# Patient Record
Sex: FEMALE | Race: WHITE | NOT HISPANIC OR LATINO
[De-identification: names, ages, dates, MRNs, and addresses within clinical notes are randomized per-mention and may not be internally consistent; named-entity substitution may affect disease eponyms.]

---

## 2018-03-12 PROBLEM — Z00.00 ENCOUNTER FOR PREVENTIVE HEALTH EXAMINATION: Status: ACTIVE | Noted: 2018-03-12

## 2022-11-29 ENCOUNTER — RESULT REVIEW (OUTPATIENT)
Age: 47
End: 2022-11-29

## 2023-09-18 ENCOUNTER — APPOINTMENT (OUTPATIENT)
Dept: ORTHOPEDIC SURGERY | Facility: CLINIC | Age: 48
End: 2023-09-18

## 2024-02-06 ENCOUNTER — RX ONLY (RX ONLY)
Age: 49
End: 2024-02-06

## 2024-02-06 ENCOUNTER — OFFICE (OUTPATIENT)
Facility: LOCATION | Age: 49
Setting detail: OPHTHALMOLOGY
End: 2024-02-06
Payer: COMMERCIAL

## 2024-02-06 DIAGNOSIS — H00.021: ICD-10-CM

## 2024-02-06 PROCEDURE — 92012 INTRM OPH EXAM EST PATIENT: CPT | Performed by: OPHTHALMOLOGY

## 2024-02-06 ASSESSMENT — CONFRONTATIONAL VISUAL FIELD TEST (CVF)
OS_FINDINGS: FULL
OD_FINDINGS: FULL

## 2024-03-20 ENCOUNTER — OFFICE (OUTPATIENT)
Facility: LOCATION | Age: 49
Setting detail: OPHTHALMOLOGY
End: 2024-03-20
Payer: COMMERCIAL

## 2024-03-20 DIAGNOSIS — H00.021: ICD-10-CM

## 2024-03-20 PROCEDURE — 99213 OFFICE O/P EST LOW 20 MIN: CPT | Performed by: OPTOMETRIST

## 2024-05-01 ENCOUNTER — APPOINTMENT (OUTPATIENT)
Dept: ORTHOPEDIC SURGERY | Facility: CLINIC | Age: 49
End: 2024-05-01
Payer: COMMERCIAL

## 2024-05-01 VITALS — BODY MASS INDEX: 28.17 KG/M2 | WEIGHT: 165 LBS | HEIGHT: 64 IN

## 2024-05-01 PROCEDURE — 99204 OFFICE O/P NEW MOD 45 MIN: CPT

## 2024-05-01 PROCEDURE — 20610 DRAIN/INJ JOINT/BURSA W/O US: CPT

## 2024-05-01 PROCEDURE — 73502 X-RAY EXAM HIP UNI 2-3 VIEWS: CPT

## 2024-05-01 NOTE — PHYSICAL EXAM
[Right] : right hip with pelvis [There are no fractures, subluxations or dislocations. No significant abnormalities are seen] : There are no fractures, subluxations or dislocations. No significant abnormalities are seen [de-identified] : Constitutional: The patient appears well developed, well nourished. Examination of patients ability to communicate functionally was normal.       Neurologic: Coordination is normal. Alert and oriented to time, place and person. No evidence of mood disorder, calm affect.         RIGHT    HIP/THIGH: Inspection of the hip/thigh is as follows: Inspection shows no swelling, no ecchymosis, no erythema, no rashes, no masses and no enlarged lymph nodes.       Palpation of the hip/thigh is as follows: greater trochanter tenderness. no groin tenderness.       Range of motion of the hip is as follows in degrees:        Flexion: 120    Abduction:  40    External rotation:  40    Internal rotation:  30        Strength testing of the hip/thigh is as follows:       Hip flexion strength:  5/5,    Hip extension strength:  5/5    Hip abduction strength:   5/5     Hip adduction strength:   5/5.       Special tests of the hip/thigh is as follows: pain in bursa with internal rotation and pain bursa with external rotation.       Neurological testing of the hip/thigh is as follows: motor exam 5/5 throughout, light touch intact throughout and no focal motor defecits.       Gait and function is as follows: non-antalgic gait.

## 2024-05-01 NOTE — DISCUSSION/SUMMARY
[de-identified] : Extensive discussion of the options was had with the patient. This discussion included both surgical and nonsurgical options. Options including but not limited to cortisone injection, viscosupplementation, physical therapy, oral anti-inflammatories and MRI were discussed with the patient. We also discussed the option of observation, allowing the patient to continue rest, ice, prescription drug NSAIDs and following up if the condition does not improve. Time was taken to go over any questions the patient had in regard to any of the treatment plans described. Patient just started PT For the hip this week. She will continue PT and today had CSI Right greater troch. She will f/u in 4-6 weeks.

## 2024-05-01 NOTE — HISTORY OF PRESENT ILLNESS
[de-identified] : Patient c/o Right hip pain for the past yr. SHe denies any injury however last yr she was doing PT for her knee and the hip became painful. Pain is lateral hip and over eduar thigh worse with activity and the worst with leaning on it/sleeping. SHe denies any groin pain. She has tried Ice and heat without much relief.

## 2024-06-03 ENCOUNTER — APPOINTMENT (OUTPATIENT)
Dept: ORTHOPEDIC SURGERY | Facility: CLINIC | Age: 49
End: 2024-06-03
Payer: COMMERCIAL

## 2024-06-03 ENCOUNTER — RESULT REVIEW (OUTPATIENT)
Age: 49
End: 2024-06-03

## 2024-06-03 VITALS — BODY MASS INDEX: 28 KG/M2 | HEIGHT: 64 IN | WEIGHT: 164 LBS

## 2024-06-03 DIAGNOSIS — Z78.9 OTHER SPECIFIED HEALTH STATUS: ICD-10-CM

## 2024-06-03 DIAGNOSIS — M70.61 TROCHANTERIC BURSITIS, RIGHT HIP: ICD-10-CM

## 2024-06-03 PROCEDURE — 99214 OFFICE O/P EST MOD 30 MIN: CPT

## 2024-06-03 RX ORDER — METHYLPREDNISOLONE 4 MG/1
4 TABLET ORAL
Qty: 1 | Refills: 0 | Status: ACTIVE | COMMUNITY
Start: 2024-06-03 | End: 1900-01-01

## 2024-06-03 NOTE — PHYSICAL EXAM

## 2024-06-03 NOTE — DISCUSSION/SUMMARY
[de-identified] : Extensive discussion of the options was had with the patient. This discussion included both surgical and nonsurgical options. Options including but not limited to cortisone injection, visco-supplementation, physical therapy, oral anti-inflammatories, MRI, were discussed with the patient. We also discussed the option of observation, allowing the patient to continue rest, ice, prescription drug NSAIDs and following up if the condition does not improve. Time was taken to go over any questions the patient had in regard to any of the treatment plans described. Patient was given Rx for MRI of the right hip to further evaluate for any ligamentous, muscle and cartilaginous injury that is suspected due to physical examination and patients description of pain, clicking, and feelings of instability and/or weakness. Patient was instructed to f/u after imaging for review. Patient was given an prescription for a Medrol dose liliana. They were instructed to take the medication as directed and to f/u in 1 month  The following information has been documented by Lety Marquez acting as a scribe. Dr. Shelton Attestation The documentation recorded by the scribe, in my presence, accurately reflects the service I, Dr. Shelton, personally performed, and the decisions made by me with my edits as appropriate.

## 2024-06-03 NOTE — PHYSICAL EXAM

## 2024-06-03 NOTE — DISCUSSION/SUMMARY
[de-identified] : Extensive discussion of the options was had with the patient. This discussion included both surgical and nonsurgical options. Options including but not limited to cortisone injection, visco-supplementation, physical therapy, oral anti-inflammatories, MRI, were discussed with the patient. We also discussed the option of observation, allowing the patient to continue rest, ice, prescription drug NSAIDs and following up if the condition does not improve. Time was taken to go over any questions the patient had in regard to any of the treatment plans described. Patient was given Rx for MRI of the right hip to further evaluate for any ligamentous, muscle and cartilaginous injury that is suspected due to physical examination and patients description of pain, clicking, and feelings of instability and/or weakness. Patient was instructed to f/u after imaging for review. Patient was given an prescription for a Medrol dose liliana. They were instructed to take the medication as directed and to f/u in 1 month  The following information has been documented by Lety Marquez acting as a scribe. Dr. Shelton Attestation The documentation recorded by the scribe, in my presence, accurately reflects the service I, Dr. Shelton, personally performed, and the decisions made by me with my edits as appropriate.

## 2024-06-19 ENCOUNTER — APPOINTMENT (OUTPATIENT)
Dept: ORTHOPEDIC SURGERY | Facility: CLINIC | Age: 49
End: 2024-06-19

## 2024-09-04 ENCOUNTER — APPOINTMENT (OUTPATIENT)
Dept: ORTHOPEDIC SURGERY | Facility: CLINIC | Age: 49
End: 2024-09-04
Payer: COMMERCIAL

## 2024-09-04 DIAGNOSIS — M70.61 TROCHANTERIC BURSITIS, RIGHT HIP: ICD-10-CM

## 2024-09-04 PROCEDURE — 99214 OFFICE O/P EST MOD 30 MIN: CPT | Mod: 25

## 2024-09-04 PROCEDURE — 20610 DRAIN/INJ JOINT/BURSA W/O US: CPT | Mod: RT

## 2024-09-04 NOTE — REASON FOR VISIT
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

## 2024-09-04 NOTE — HISTORY OF PRESENT ILLNESS
[de-identified] : Follow up on right hip pain. Patient was prescribed Medrol 4 months ago with no relief. Patient reports she is still having pain.  She had tried CSI to the troch bursa with 2 days of relief.  She denies any groin pain. She did try PT which made it worse.

## 2024-09-04 NOTE — DISCUSSION/SUMMARY
[de-identified] : Extensive discussion of the options was had with the patient. This discussion included both surgical and nonsurgical options. Options including but not limited to cortisone injection, physical therapy, prescription oral anti-inflammatories, arthroscopy were discussed with the patient. We also discussed the option of observation, allowing the patient to continue rest, ice and following up if the condition does not improve. Time was taken to go over any questions the patient had in regard to any of the treatment plans described.  I advised the patient they can take NSAIDS OTC including 2 tabs of Naproxen 225mg BID. The patient was advised that the NSAID should be taken with food. Time was taken to discuss the importance of proper prescription drug management. Patient was advised that they should continue the Rx for the full two weeks even if their symptoms dissipate. The patient was also warned of potential risks/side effects of the medication. These potential risks include bruising, gastrointestinal bleed, gastrointestinal burning, allergic reaction and reduced blood clotting. The patient declined prescription NSAIDs.    Patient was given a CSI to the right GTB, advised to ice if sore and will observe over the next 24 hours for any redness, swelling or increased discomfort. The indication for this procedure was pain and inflammation. The site was prepped with betadine and sterile technique used. An injection of Lidocaine 5cc of 1% was used Betamethasone (Celestone) 1cc of 6mg.  The patient tolerated procedure well. They were advised to call if redness, pain or fever occur and apply ice for 15 minutes out of every hour for the next 12-24 hours as tolerated. The risks benefits, and alternatives have been discussed. These risks include infection, hemarthrosis, allergic reaction, bleeding and hyperglycemia. Verbal understanding and consent was obtained. There is a moderate risk of morbidity with further treatment, especially from use of prescription strength medication.   At this time the plan that the patient wishes to move forward with is to observe and continue self care including but not limited to HEP, Ice, NSAIDs and rest as needed. Patient was instructed to f/u if their symptoms do not improve or if there are any further concerns.   Entered by Shavonne Noonan acting as scribe. Dr. Shelton Attestation The documentation recorded by the scribe, in my presence, accurately reflects the service I, Dr. Shelton, personally performed, and the decisions made by me with my edits as appropriate.

## 2024-09-04 NOTE — DATA REVIEWED
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

## 2024-09-04 NOTE — PROCEDURE
[Large Joint Injection] : Large joint injection [Right] : of the right [Greater Trochanteric Bursa] : greater trochanteric bursa [Pain] : pain [Inflammation] : inflammation [Betadine] : betadine [Sterile technique used] : sterile technique used [___ cc    6mg] :  Betamethasone (Celestone) ~Vcc of 6mg [___ cc    1%] : Lidocaine ~Vcc of 1%  [] : Patient tolerated procedure well [Call if redness, pain or fever occur] : call if redness, pain or fever occur [Apply ice for 15min out of every hour for the next 12-24 hours as tolerated] : apply ice for 15 minutes out of every hour for the next 12-24 hours as tolerated [Risks, benefits, alternatives discussed / Verbal consent obtained] : the risks benefits, and alternatives have been discussed, and verbal consent was obtained

## 2024-09-06 RX ORDER — DICLOFENAC SODIUM 75 MG/1
75 TABLET, DELAYED RELEASE ORAL
Qty: 30 | Refills: 0 | Status: ACTIVE | COMMUNITY
Start: 2024-09-06 | End: 1900-01-01

## 2024-10-07 ENCOUNTER — APPOINTMENT (OUTPATIENT)
Dept: ORTHOPEDIC SURGERY | Facility: CLINIC | Age: 49
End: 2024-10-07
Payer: COMMERCIAL

## 2024-10-07 VITALS — WEIGHT: 162 LBS | BODY MASS INDEX: 27.66 KG/M2 | HEIGHT: 64 IN

## 2024-10-07 DIAGNOSIS — M70.61 TROCHANTERIC BURSITIS, RIGHT HIP: ICD-10-CM

## 2024-10-07 PROCEDURE — 99214 OFFICE O/P EST MOD 30 MIN: CPT

## 2024-10-07 RX ORDER — DICLOFENAC SODIUM 75 MG/1
75 TABLET, DELAYED RELEASE ORAL
Qty: 50 | Refills: 0 | Status: ACTIVE | COMMUNITY
Start: 2024-10-07 | End: 1900-01-01

## 2024-12-09 ENCOUNTER — APPOINTMENT (OUTPATIENT)
Dept: ORTHOPEDIC SURGERY | Facility: CLINIC | Age: 49
End: 2024-12-09

## 2025-09-13 ENCOUNTER — NON-APPOINTMENT (OUTPATIENT)
Age: 50
End: 2025-09-13